# Patient Record
(demographics unavailable — no encounter records)

---

## 2024-10-16 NOTE — ASSESSMENT
[FreeTextEntry1] : 60 year old female with PMH of Breast cancer s/p mastectomy, KELLEY, HLD, cerebral aneurysm, preDM, A1 5.7, and Dyslipidemia presents for follow up   1.  Exertional shortness of breath, resolved  She is exercising, walks 30 minutes daily without any symptoms TTE with LVEF 60% and normal diastolic function.  - EKG: NSR WNL  - EST negative study  2. Hx of cerebral aneurysm  -  lower extremities with no evidence of aneurysmal disease   3. HLD: and hypertriglyceridemia - on Crestor 5 mg daily - LDL is 82 -- diet modification - has plans for repeat lipids in 6 months, will f/u post labs   4. Hypoglycemia - which couyld be related to cortisol level  recommend to see Endorcinologist and follow up   Valencia Garcia D.O. [facility] Cardiology/Vascular Cardiology -[facility]iology Telephone # [phone]

## 2024-10-16 NOTE — CARDIOLOGY SUMMARY
[de-identified] : Sinus Rhythm @ 64 bpm WITHIN NORMAL LIMITS 4/22/2024 Sinus Rhythm WNL 73    [de-identified] : 3/28/2024 Normal study, 12.40 METS, 9 in 43 sec.  87% MPHR, Normal exercise treadmill test  [de-identified] : TTE 2/8/2024 CONCLUSIONS: 1. Left ventricular systolic function is normal with an ejection fraction of 60 % by Nicolas's method of disks with an ejection fraction visually estimated at 55 to 60 %. 2. Normal left ventricular diastolic function. 3. Normal right ventricular cavity size and normal systolic function. 4. Mild tricuspid regurgitation. 5. Trace mitral regurgitation. 6. No pericardial effusion seen. 7. No prior echocardiogram is available for comparison. [de-identified] : 1/29/2024  Mild calcified plaque without duplex evidence of lower extremity arterial aneurysm. No duplex evidence of hemodynamically significant stenosis or occlusion in the arteries of either lower extremity.

## 2024-10-16 NOTE — HISTORY OF PRESENT ILLNESS
[FreeTextEntry1] : 60 year old female with PMH of Breast cancer s/p mastectomy, KELLEY, HLD, cerebral aneurysm, preDM, A1 5.7, and Dyslipidemia presents for follow up   Patient notes that since the last visit has been doing well but notes that she has been noting and using the Dexcom due to hypoglycemic episodes and yet to follow up with Endocrinologist but notes that she gets hypoglycemic at 4 am and wakes up either due to the monitor or in a cold sweat  Otherwise no cardiac issues and no active cardiac complaints no palpitations dizziness lightheadedness or syncope

## 2024-11-19 NOTE — HISTORY OF PRESENT ILLNESS
[FreeTextEntry1] : INTERIM HISTORY: Reports experiencing fatigue and fluctuations with her blood sugar, which started about year ago. She read that this may be a side effect of venlafaxine. Facial pain currently managed with medication and acupuncture 1x/week. She denies new, concerning neurological symptoms.   INITIAL VISIT 1/27/21: Ms. Ritter is a 56 year-old woman with PMH of Bell's Palsy one year ago, who presents to the office today for evaluation of left-sided facial pain. She states that she started experiencing a headache three weeks ago, and then over the past 10 days developed a pain over the left-side of her face in the fifth cranial nerve distribution. She was evaluated by her dentist and oral surgeon who ruled-out dental issues causing her pain. She states that the pain is constant and varies from a 2/10 pressure-like sensation to a 9-10/10 sharp, electric-like feeling. Pain is unrelieved with Tylenol or NSAIDs and improves with drinking cool water. She states that she has tinnitus at baseline but tinnitus has worsened since facial pain began. She denies loss of vision, changes in personality or cognition, difficulty speaking or finding the correct words, unilateral weakness or impaired sensation, problems with coordination, difficulty walking or falls.   2/23/21: Since her last visit, Ms. Ritter had relief of pain on CBZ 300mg QD until this past Thursday she began experiencing facial pain and pressure on bilateral temples and along the maxillary sinuses, occipital lymph node swelling/enlargement and swelling of the area around her left eye, gums and face. She applied ice to the back of her head which she states helped to relieve the pain and swelling. She had an MRI head performed on 1/28/21 which was negative for acute infarct, intracranial hemorrhage or mass lesion, it which showed no mass or vessel deforming the left cisternal trigeminal nerve, a vein running lateral to the root of entry of the right cisternal trigeminal nerve with minimal to no deformity to the nerve itself and no abnormalities of the remaining visualized portions of the bilateral trigeminal nerve.  3/10/23: Since her last visit Ms. Maries symptoms have been stable. She has been managing pain with acupuncture. She reports left ear tinnitus. Tinnitus is high pitched and nonpulsatile. She reports pain returns if just prior to acupuncture appointments (about 3 weeks). She reports intermittent left face paresthesias and headaches.   5/12/23: Patient had cerebral angiogram for findings of cerebral aneurysm on MRA. Angiogram showed a small 2.0 mm x 2.0 mm anterior cavernous left ICA aneurysm with a 1.7 mm neck and a small broad-based 2.5 mm mid cavernous right ICA aneurysm with a broad 5.4 mm base. She reports improvement in symptoms of tinnitus and pain on venlafaxine. She reports recently having an episode of hearing her pulse in her right ear. She has no further complaints. Groin site healed without issues.   11/17/24: Doing well. Notes improvement in pain and tinnitus since beginning venlafaxine. She now only requires acupuncture every 2-3 week and states this helps with tinnitus and neck pain. She is tolerating medication without side effects. QTc 457

## 2024-11-19 NOTE — DISCUSSION/SUMMARY
[FreeTextEntry1] : Ms. Ritter is a 60 year-old woman who presented to the office today for follow-up evaluation of facial pain and tinnitus. MRA did not show evidence of nerve compression and cerebral aneurysms remain stable.  Will taper off venlafaxine to see if side effects resolve (take one tablet every other day x 2 weeks, then discontinue). Continue acupuncture for pain. She will follow-up with me as needed. All of patient's questions and concerns were addressed.

## 2024-11-19 NOTE — PHYSICAL EXAM
[FreeTextEntry1] : GENERAL PHYSICAL EXAM:\par  GEN: no distress, normal affect\par  HEENT: NCAT, OP clear, tenderness to palpation of the bilateral maxillary sinuses\par  EYES: sclera white, conjunctiva clear, no nystagmus\par  NECK: supple\par  CV: RRR 		\par  PULM: CTAB, no wheezing\par  GI: soft ABD, +BS, NT, ND\par  EXT: peripheral pulse intact, no cyanosis\par  MSK: muscle tone and strength normal\par  SKIN: warm, dry, no rash or lesion on exposed skin \par  \par  NEUROLOGICAL EXAM:\par  Mental Status\par  Orientation: alert and oriented to person, place, time, and situation, 3/3 recall after 5 minutes\par  Language: clear and fluent, intact comprehension and repetition, intact naming and reading\par  \par  Cranial Nerves\par  II: full visual fields intact \par  III, IV, VI: PERRL, EOMI\par  V, VII: facial sensation and movement intact and symmetric \par  VIII: hearing intact \par  IX, X: uvula midline, soft palate elevates normally \par  XI: BL shoulder shrug intact \par  XII: tongue midline\par  \par  Motor\par  Shoulder abd: 5 (R), 5 (L)\par  EF/EE: 5 (R), 5 (L)\par  WF/WE: 5 (R), 5 (L)\par  hand : 5 (R), 5 (L)\par  HF/HE: 5 (R), 5 (L)\par  KF/KE: 5 (R), 5 (L)\par  DF/PF: 5 (R), 5 (L) \par  Tone and bulk are normal in upper and lower limbs\par  No pronator drift\par  \par  Sensation\par  Intact to light touch and vibration in all 4 EXTs\par  \par  Reflex\par  2+ in BL biceps, brachioradialis, patella\par  \par  Coordination\par  Normal FTN bilaterally\par  Dysdiadochokinesia not present. \par  Able to perform rapid, alternating movements\par  \par  Gait\par  Normal stance, stride, and pivot turn\par  Negative Romberg

## 2024-12-23 NOTE — HISTORY OF PRESENT ILLNESS
[FreeTextEntry1] : Ms. SEAN FENG is a 60 year old female with past medical history of left breast cancer who presents now s/p revision of reconstructed breasts and abdominal donor site 12/13/24 Doing well Denies fever, chills, LE pain/edema she states after the surgery she became sick with a sinus infection and has been on antibiotics for the last 2 weeks, she is feeling better today

## 2024-12-23 NOTE — PHYSICAL EXAM
[NI] : Normal [de-identified] : b/l breast soft and symmetrical reconstructed nipples with good projection incisions c/d/i no palpable fluid collections, fluctuance, signs of infection or dehiscence sutures in place [de-identified] : abdomen soft and non tender incisions c/d/i no palpable fluid collections, fluctuance, signs of infection or dehiscence there is soft bulging in the lower midline of the abdomen which has been present since her first surgery as per patient, non tender

## 2024-12-23 NOTE — ASSESSMENT
[FreeTextEntry1] : 61 yo female s/p revision of reconstructed breasts and abdomen 12/13/24 doing well every other suture removed today without difficulty monitor for redness, swelling, fever, chills no heavy lifting or strenuous activity continue to wear soft, non wire bra she is encouraged to call if there are any changes, or with any questions or concerns  all pt questions answered to the best of my ability discussed trying to wear compression garment to help with abdominal swelling, no fluid collection palpated at this time Follow up Friday for completion of suture removal

## 2025-01-03 NOTE — ASSESSMENT
[FreeTextEntry1] : 59 yo female s/p revision of reconstructed breasts 12/13/24 doing better sutures removed today without difficulty monitor for redness, swelling, fever, chills no heavy lifting or strenuous activity continue to wear soft, non wire bra she is encouraged to call if there are any changes, or with any questions or concerns  all pt questions answered to the best of my ability Follow up in 1 week with Dr. Almaraz for work clearance

## 2025-01-03 NOTE — HISTORY OF PRESENT ILLNESS
[FreeTextEntry1] : Ms. SEAN FENG is a 60 year old female with past medical history of left breast cancer who presents now s/p revision of reconstructed breasts and abdominal donor site 12/13/24 Doing well Denies fever, chills, LE pain/edema She was recently diagnosed with COVID, she is feeling better today, presents for suture removal

## 2025-01-03 NOTE — PHYSICAL EXAM
[NI] : Normal [de-identified] : b/l breast soft and symmetrical reconstructed nipples with good projection incisions c/d/i no palpable fluid collections, fluctuance, signs of infection or dehiscence sutures in place [de-identified] : abdomen soft and non tender incisions c/d/i no palpable fluid collections, fluctuance, signs of infection or dehiscence there is soft bulging in the lower midline of the abdomen which has been present since her first surgery as per patient, non tender

## 2025-01-30 NOTE — CONSULT LETTER
[Dear  ___] : Dear  [unfilled], [Consult Letter:] : I had the pleasure of evaluating your patient, [unfilled]. [Please see my note below.] : Please see my note below. [Referral Closing:] : Thank you very much for seeing this patient.  If you have any questions, please do not hesitate to contact me. [Sincerely,] : Sincerely, [DrJasmine  ___] : Dr. VANEGAS

## 2025-01-30 NOTE — HISTORY OF PRESENT ILLNESS
[TextBox_4] : 1/30/25  60F  Witnessed apneas during anesthesia for breast implant replacement post bilateral mastectomy for breast cancer Mild snoring Witnessed breathing pauses at night per spouse   [facility] ED

## 2025-03-03 NOTE — PHYSICAL EXAM
[Chaperone Present] : A chaperone was present in the examining room during all aspects of the physical examination [FreeTextEntry2] : Sandra CHUNG [Appropriately responsive] : appropriately responsive [Alert] : alert [No Acute Distress] : no acute distress [Soft] : soft [Non-tender] : non-tender [Non-distended] : non-distended [No Lesions] : no lesions [No Mass] : no mass [Oriented x3] : oriented x3 [Examination Of The Breasts] : a normal appearance [Right Breast Absent] : a total mastectomy [Breast Reconstruction Right] : breast reconstruction [Left Breast Absent] : a total mastectomy [___] : a [unfilled] ~Ucm mastectomy scar [Breast Reconstruction Left] : breast reconstruction [No Masses] : no breast masses were palpable [Labia Majora] : normal [Labia Minora] : normal [Normal] : normal [Absent] : absent [Uterine Adnexae] : normal

## 2025-03-03 NOTE — HISTORY OF PRESENT ILLNESS
[Patient reported PAP Smear was normal] : Patient reported PAP Smear was normal [Patient reported colonoscopy was normal] : Patient reported colonoscopy was normal [FreeTextEntry1] : 61 y/o P3 female, LMP: 2022, presents as a new patient for annual GYN visit c/o vaginal dryness.  h/o Breast Ca-following breast specialist. gets breast imaging by breast Ca specialist.   Patient denies any GYN complaints.   Sexual Activity: monogamous with  Social/Mental Health: Denies ETOH, tobacco or any illicit drug use.  Denies depression, anxiety, thoughts of personal harm or suicidal ideation.  ROS:  Denies fever/chills, HA, Cough/sore throat, CP, SOB, N/V, Diarrhea/Constipation, Pelvic pain, Urinary frequency/urgency/incontinence, irregular vaginal bleeding, discharge or irritation.    Medical History GYN HX: 2 c/sections, 1 , >30 yrs ago abnl pap, HPV?, hysterectomy for AUB. h/o BCA: double mastectomy, skin graft for breast implants, HLD. PMH: see chart PSH: see chart Meds: rosuvastatin  Allergies: NKDA fam hx: denies [PapSmeardate] : yrs ago [TextBox_37] : as referral  [ColonoscopyDate] : UTD [Post-Menopause, No Sxs] : post-menopausal, currently without symptoms [Patient refuses STI testing] : Patient refuses STI testing

## 2025-03-20 NOTE — HISTORY OF PRESENT ILLNESS
[TextBox_4] : 1/30/25  60F  Witnessed apneas during anesthesia for breast implant replacement post bilateral mastectomy for breast cancer Mild snoring Witnessed breathing pauses at night per spouse   [facility] ED

## 2025-03-20 NOTE — DISCUSSION/SUMMARY
[FreeTextEntry1] : IMP  Mild WALLY with moderate sleep fragmentation Sleep apnea is moderate during REM sleep Positional component Noted  Plan  Nasal APAP 3-month FU  Oral appliance referral if fails CPAP

## 2025-04-07 NOTE — ASSESSMENT
[FreeTextEntry1] : 60F mix of breast ca, GERD, prn daily for GERD management. For breakthrough symptoms, may use Omeprazole 40mg intermittently as neede dfor severe GERD . Recommend lifestyle modifications: elevate the head of the bed, avoid eating 3 hours before bedtime, eliminate trigger foods (spicy, acidic, caffeine, alcohol), eat smaller meals.   Follow up in 3 months.

## 2025-04-07 NOTE — PHYSICAL EXAM

## 2025-04-17 NOTE — PHYSICAL EXAM
[Alert] : alert [Oriented to Person] : oriented to person [Oriented to Place] : oriented to place [Oriented to Time] : oriented to time [Calm] : calm [de-identified] : Well appearing, NAD [de-identified] : Normal rate [de-identified] : Normal respiratory effort [de-identified] : (+)diastasis of right rectus abdominus more prominent while sitting up, soft, nontender, nondistended

## 2025-04-17 NOTE — REVIEW OF SYSTEMS
[Fever] : no fever [Chills] : no chills [Heart Rate Is Slow] : the heart rate was not slow [Heart Rate Is Fast] : the heart rate was not fast [Shortness Of Breath] : no shortness of breath [Abdominal Pain] : no abdominal pain [Vomiting] : no vomiting [Constipation] : no constipation [Negative] : Heme/Lymph

## 2025-04-17 NOTE — ASSESSMENT
[FreeTextEntry1] : Brenda Ritter is a 60F with pmh breast cancer s/p recent bilateral deep flap breast reconstruction presenting with abdominal wall bulge near right rectus abdominus.  Plan: Robotic Assisted Abdominal Bulge Repair with Phasix Mesh. Phasix mesh was discussed due to her previous history of reaction to foreign body. Pt was also advised that there is a small chance that the bulge can come back and may need open repair. All questions were answered in detail and the patient expressed full understanding.

## 2025-04-17 NOTE — HISTORY OF PRESENT ILLNESS
[de-identified] : Brenda Ritter is a 60F with pmh breast cancer s/p recent bilateral deep flap breast reconstruction presenting with 2 months of right sided abdominal bulge. She first noticed it when doing sit-ups. It goes away with standing and is worse while sitting. It does not cause any pain. She denies any nausea, vomiting, or constipation.

## 2025-06-12 NOTE — HISTORY OF PRESENT ILLNESS
[de-identified] : Ms. Ritter is a 60 year old female who presents today for f/u s/p robotic assisted laparoscopic ventral hernia repair with phasix mesh 5/30/25. Recovering well. Tolerating diet. Denies GI symptoms. No fever/chills. Ambulating.  Exam A&Ox3, NAD Respirations nonlabored Abdomen soft, NTND. Steris removed. Port sites well healed, no surrounding erythema/edema/warmth

## 2025-06-12 NOTE — PLAN
[FreeTextEntry1] : 59 yo female recovering well from surgery Post op instructions reviewed with patient including weight-lifting restrictions OK to return to work 6 weeks from date of surgery  All of patient's questions answered to her apparent satisfaction Return to office PRN

## 2025-06-17 NOTE — PHYSICAL EXAM
[de-identified] : General Exam: Appearance: well developed and nourished Orientation: Alert and oriented to person, place, time. Mood: mood and affect well-adjusted, pleasant and cooperative, appropriate for clinical and encounter circumstances   Right Ankle: ROM: grossly intact, with pain Skin: +ecchymosis and swelling; intact, no rashes or lesions. Inspection: +TTP to right lateral malleolus; normal alignment, no warmth, no masses   Dorsiflexion: Strength: 5/5, normal muscle tone. Plantarflexion: Strength: 5/5, normal muscle tone. Sensation: intact to light touch Pulses: 2+DP [de-identified] : Previous right ankle and right foot imaging obtained at [facility] ED show a small right lateral malleolus fracture

## 2025-06-17 NOTE — HISTORY OF PRESENT ILLNESS
[de-identified] : This is a 61yo female presenting with complaint of right ankle pain. Patient reports on 6/14/25, she missed a step and rolled her ankle. She feels pain to lateral aspect of ankle worsened with weightbearing and movement. She went to [facility] ED and was determined to have a left ankle fracture. She currently is in a splint and using crutches. She is here for orthopedic evaluation today.

## 2025-06-17 NOTE — DISCUSSION/SUMMARY
[de-identified] : 61yo female presents today with a right ankle fracture. I discussed with patient as fracture is small and nondisplaced, it should heal well without operative intervention. We discussed an overall recovery of about 8-12 weeks, with bony healing expected at 4-6 weeks. She was dispensed a CAM boot today. Patient may WBAT in boot. Boot may be removed at hygiene, with rest and sleep. She may ice and use tylenol or NSAID for pain relief. Follow up 4 weeks.  The patient was given the opportunity to ask questions and all questions were answered to their satisfaction.

## 2025-06-17 NOTE — PHYSICAL EXAM
[de-identified] : General Exam: Appearance: well developed and nourished Orientation: Alert and oriented to person, place, time. Mood: mood and affect well-adjusted, pleasant and cooperative, appropriate for clinical and encounter circumstances   Right Ankle: ROM: grossly intact, with pain Skin: +ecchymosis and swelling; intact, no rashes or lesions. Inspection: +TTP to right lateral malleolus; normal alignment, no warmth, no masses   Dorsiflexion: Strength: 5/5, normal muscle tone. Plantarflexion: Strength: 5/5, normal muscle tone. Sensation: intact to light touch Pulses: 2+DP [de-identified] : Previous right ankle and right foot imaging obtained at [facility] ED show a small right lateral malleolus fracture

## 2025-06-17 NOTE — DISCUSSION/SUMMARY
[de-identified] : 59yo female presents today with a right ankle fracture. I discussed with patient as fracture is small and nondisplaced, it should heal well without operative intervention. We discussed an overall recovery of about 8-12 weeks, with bony healing expected at 4-6 weeks. She was dispensed a CAM boot today. Patient may WBAT in boot. Boot may be removed at hygiene, with rest and sleep. She may ice and use tylenol or NSAID for pain relief. Follow up 4 weeks.  The patient was given the opportunity to ask questions and all questions were answered to their satisfaction.

## 2025-06-17 NOTE — HISTORY OF PRESENT ILLNESS
[de-identified] : This is a 61yo female presenting with complaint of right ankle pain. Patient reports on 6/14/25, she missed a step and rolled her ankle. She feels pain to lateral aspect of ankle worsened with weightbearing and movement. She went to [facility] ED and was determined to have a left ankle fracture. She currently is in a splint and using crutches. She is here for orthopedic evaluation today.

## 2025-07-07 NOTE — HISTORY OF PRESENT ILLNESS
[FreeTextEntry1] : Patient with history of breast cancer-had recent reconstruction-deep flap.  Then developed incisional hernia.  Had hernia repair.  Since that time her GERD has disappeared essentially.  Back in February 2024 she had an EGD which showed a hiatal hernia.  She was on omeprazole for a period of time.  Then last visit she was off omeprazole and just taking aloe and Tums as needed.  She was then given Pepcid 40 mg which she took as needed.  However since her hernia surgery she has not required any Pepcid.  Only required Tums twice in 4 weeks.  November 2024 hemoglobin 11.6.  Colonoscopy done by colorectal surgery Dr. Cronin In 2023 showed 2 polyps which were hot snared in the sigmoid colon-adenomatous polyp

## 2025-07-07 NOTE — ASSESSMENT
[FreeTextEntry1] : A/P Patient with GERD currently diet controlled Today's instructions for acid reflux include avoid provocative foods. For example citrus alcohol coffee chocolate mints. Smaller meals, no eating 3 hours prior to bedtime and elevate head of the bed prior to sleep.  I have renewed her Pepcid 40 mg as needed prescription  History of colon polyps-due for colonoscopy 2023  Follow-up telephone visit in 6 months

## 2025-07-21 NOTE — PHYSICAL EXAM
[de-identified] : General Exam: Appearance: well developed and nourished Orientation: Alert and oriented to person, place, time. Mood: mood and affect well-adjusted, pleasant and cooperative, appropriate for clinical and encounter circumstances  Right Ankle: Skin: intact, no rashes or lesions. Inspection: +TTP to right lateral malleolus; normal alignment, no warmth, no masses Dorsiflexion: Strength: 5/5, normal muscle tone. Plantarflexion: Strength: 5/5, normal muscle tone. Sensation: intact to light touch Pulses: 2+DP [de-identified] : full ROM [de-identified] : 3 views of the right ankle obtained today show a healed distal fibula fracture

## 2025-07-21 NOTE — HISTORY OF PRESENT ILLNESS
[de-identified] : This is a 61yo female presenting for follow up on a right ankle fracture. DOI: 6/14/25. Patient has been using CAM boot for about a month. She states her pain has improved significantly, but her ankle does feel stiff, and she does continue to experience swelling. She is here for follow up today.